# Patient Record
Sex: MALE | Race: WHITE | NOT HISPANIC OR LATINO | URBAN - METROPOLITAN AREA
[De-identification: names, ages, dates, MRNs, and addresses within clinical notes are randomized per-mention and may not be internally consistent; named-entity substitution may affect disease eponyms.]

---

## 2020-06-10 ENCOUNTER — TELEPHONE ENCOUNTER (OUTPATIENT)
Dept: URBAN - METROPOLITAN AREA CLINIC 23 | Facility: CLINIC | Age: 55
End: 2020-06-10

## 2020-07-16 ENCOUNTER — LAB OUTSIDE AN ENCOUNTER (OUTPATIENT)
Dept: URBAN - METROPOLITAN AREA CLINIC 92 | Facility: CLINIC | Age: 55
End: 2020-07-16

## 2020-07-16 ENCOUNTER — TELEPHONE ENCOUNTER (OUTPATIENT)
Dept: URBAN - METROPOLITAN AREA CLINIC 92 | Facility: CLINIC | Age: 55
End: 2020-07-16

## 2020-07-17 ENCOUNTER — WEB ENCOUNTER (OUTPATIENT)
Dept: URBAN - METROPOLITAN AREA CLINIC 94 | Facility: CLINIC | Age: 55
End: 2020-07-17

## 2020-07-17 ENCOUNTER — OFFICE VISIT (OUTPATIENT)
Dept: URBAN - METROPOLITAN AREA CLINIC 94 | Facility: CLINIC | Age: 55
End: 2020-07-17
Payer: COMMERCIAL

## 2020-07-17 DIAGNOSIS — I85.00 ESOPHAGEAL VARICES: ICD-10-CM

## 2020-07-17 DIAGNOSIS — K74.60 DECOMPENSATED CIRRHOSIS OF LIVER: ICD-10-CM

## 2020-07-17 DIAGNOSIS — R18.8 ASCITES: ICD-10-CM

## 2020-07-17 DIAGNOSIS — K74.69 CIRRHOSIS, CRYPTOGENIC: ICD-10-CM

## 2020-07-17 DIAGNOSIS — R41.0 CONFUSION: ICD-10-CM

## 2020-07-17 PROCEDURE — G8417 CALC BMI ABV UP PARAM F/U: HCPCS | Performed by: INTERNAL MEDICINE

## 2020-07-17 PROCEDURE — 3017F COLORECTAL CA SCREEN DOC REV: CPT | Performed by: INTERNAL MEDICINE

## 2020-07-17 PROCEDURE — 4004F PT TOBACCO SCREEN RCVD TLK: CPT | Performed by: INTERNAL MEDICINE

## 2020-07-17 PROCEDURE — G8427 DOCREV CUR MEDS BY ELIG CLIN: HCPCS | Performed by: INTERNAL MEDICINE

## 2020-07-17 PROCEDURE — G9906 PT RECV TBCO CESS INTERV: HCPCS | Performed by: INTERNAL MEDICINE

## 2020-07-17 PROCEDURE — G9902 PT SCRN TBCO AND ID AS USER: HCPCS | Performed by: INTERNAL MEDICINE

## 2020-07-17 PROCEDURE — 99214 OFFICE O/P EST MOD 30 MIN: CPT | Performed by: INTERNAL MEDICINE

## 2020-07-17 RX ORDER — RIFAXIMIN 550 MG/1
1 TABLET TABLET ORAL TWICE A DAY
Qty: 60 TABLET | Refills: 3 | OUTPATIENT
Start: 2020-07-17 | End: 2020-11-13

## 2020-07-17 RX ORDER — PROPRANOLOL HYDROCHLORIDE 10 MG/1
1 TABLET TABLET ORAL ONCE A DAY
Status: ACTIVE | COMMUNITY

## 2020-07-17 RX ORDER — IPRATROPIUM BROMIDE AND ALBUTEROL SULFATE 2.5; .5 MG/3ML; MG/3ML
SOLUTION RESPIRATORY (INHALATION)
Qty: 0 | Refills: 0 | Status: DISCONTINUED | COMMUNITY
Start: 1900-01-01

## 2020-07-17 RX ORDER — METRONIDAZOLE 500 MG/1
TAKE 1 TABLET (500 MG) BY ORAL ROUTE 3 TIMES PER DAY FOR 10 DAYS TABLET, FILM COATED ORAL
Qty: 30 | Refills: 0 | Status: DISCONTINUED | COMMUNITY
Start: 2017-08-31

## 2020-07-17 RX ORDER — SPIRONOLACTONE 100 MG/1
TAKE 1 TABLET (100 MG) BY ORAL ROUTE ONCE DAILY FOR 30 DAYS TABLET, FILM COATED ORAL 1
Qty: 30 | Refills: 6 | Status: ACTIVE | COMMUNITY
Start: 2020-05-01 | End: 2020-11-27

## 2020-07-17 RX ORDER — FUROSEMIDE 40 MG/1
TAKE 1 TABLET (40 MG) BY ORAL ROUTE ONCE DAILY FOR 30 DAYS TABLET ORAL 1
Qty: 30 | Refills: 6 | Status: ACTIVE | COMMUNITY
Start: 2020-05-01 | End: 2020-11-27

## 2020-07-17 RX ORDER — RIFAXIMIN 550 MG/1
TAKE 1 TABLET (550 MG) BY ORAL ROUTE 2 TIMES PER DAY FOR 21 DAYS TABLET ORAL 2
Qty: 42 | Refills: 1 | Status: DISCONTINUED | COMMUNITY
Start: 2017-08-22

## 2020-07-17 RX ORDER — SPIRONOLACTONE 100 MG/1
1 TABLET TABLET, FILM COATED ORAL BID
Qty: 60 | Refills: 4 | OUTPATIENT
Start: 2020-07-17 | End: 2020-12-13

## 2020-07-17 NOTE — PHYSICAL EXAM GASTROINTESTINAL
Abdomen ,obese  nontender, moderate abdominal distention with large volume ascites,  striae present lower abdomen, no guarding or rigidity , no masses palpable , normal bowel sounds , Liver and Spleen  no hepatosplenomegaly , liver nontender , spleen not palpable

## 2020-07-17 NOTE — HPI-TODAY'S VISIT:
The patient presents today for unscheduled office visit. He was in the office for labs and requested office visit. She states that her  has been acting strange lately. He has been confused lately and is easily agitated/angered. He reports noncompliance with medications and only takes Furosemide every 3-4 days when he feels abdominal swelling. He was previously on Xifaxan but discontinued medication awhile ago. His wife is concerned because his abdomen is very swollen and mildly erythematous. He reports having a fever earlier in the week but he did not actually take it.   Patient was previously evaluated by DR Bradley Dietz for possible transplant but he failed screening secondary to tobacco use. Patient is scheduled to have an EGD on Monday at [facility]. Last Paracentesis was June 18th at [facility].   Patient denies ever having a colonoscopy.

## 2020-07-18 LAB
A/G RATIO: 1.2
ALBUMIN: 2.9
ALKALINE PHOSPHATASE: 111
ALT (SGPT): 6
AST (SGOT): 12
BASO (ABSOLUTE): 0
BASOS: 0
BILIRUBIN, TOTAL: 1.3
BUN/CREATININE RATIO: 16
BUN: 11
CALCIUM: 8.1
CARBON DIOXIDE, TOTAL: 23
CHLORIDE: 103
CREATININE: 0.68
EGFR IF AFRICN AM: 125
EGFR IF NONAFRICN AM: 108
EOS (ABSOLUTE): 0.1
EOS: 2
GLOBULIN, TOTAL: 2.4
GLUCOSE: 121
HEMATOCRIT: 25.9
HEMATOLOGY COMMENTS:: (no result)
HEMOGLOBIN: 7.8
IMMATURE CELLS: (no result)
IMMATURE GRANS (ABS): 0
IMMATURE GRANULOCYTES: 0
INR: 1.2
LYMPHS (ABSOLUTE): 0.3
LYMPHS: 11
MCH: 25.9
MCHC: 30.1
MCV: 86
MONOCYTES(ABSOLUTE): 0.2
MONOCYTES: 9
NEUTROPHILS (ABSOLUTE): 2
NEUTROPHILS: 78
NRBC: (no result)
PLATELETS: 57
POTASSIUM: 3.9
PROTEIN, TOTAL: 5.3
PROTHROMBIN TIME: 13.1
RBC: 3.01
RDW: 14.5
SODIUM: 136
WBC: 2.6

## 2020-07-20 ENCOUNTER — OFFICE VISIT (OUTPATIENT)
Dept: URBAN - METROPOLITAN AREA MEDICAL CENTER 34 | Facility: MEDICAL CENTER | Age: 55
End: 2020-07-20

## 2020-07-20 LAB — AMMONIA, PLASMA: 59

## 2020-08-03 ENCOUNTER — TELEPHONE ENCOUNTER (OUTPATIENT)
Dept: URBAN - METROPOLITAN AREA CLINIC 94 | Facility: CLINIC | Age: 55
End: 2020-08-03

## 2020-08-04 ENCOUNTER — OFFICE VISIT (OUTPATIENT)
Dept: URBAN - METROPOLITAN AREA CLINIC 94 | Facility: CLINIC | Age: 55
End: 2020-08-04

## 2020-09-14 ENCOUNTER — TELEPHONE ENCOUNTER (OUTPATIENT)
Dept: URBAN - METROPOLITAN AREA CLINIC 94 | Facility: CLINIC | Age: 55
End: 2020-09-14

## 2020-11-16 ENCOUNTER — TELEPHONE ENCOUNTER (OUTPATIENT)
Dept: URBAN - METROPOLITAN AREA CLINIC 92 | Facility: CLINIC | Age: 55
End: 2020-11-16

## 2020-11-30 ENCOUNTER — WEB ENCOUNTER (OUTPATIENT)
Dept: URBAN - METROPOLITAN AREA CLINIC 94 | Facility: CLINIC | Age: 55
End: 2020-11-30

## 2020-12-01 ENCOUNTER — LAB OUTSIDE AN ENCOUNTER (OUTPATIENT)
Dept: URBAN - METROPOLITAN AREA CLINIC 94 | Facility: CLINIC | Age: 55
End: 2020-12-01

## 2020-12-01 ENCOUNTER — WEB ENCOUNTER (OUTPATIENT)
Dept: URBAN - METROPOLITAN AREA CLINIC 94 | Facility: CLINIC | Age: 55
End: 2020-12-01

## 2020-12-01 ENCOUNTER — OFFICE VISIT (OUTPATIENT)
Dept: URBAN - METROPOLITAN AREA CLINIC 94 | Facility: CLINIC | Age: 55
End: 2020-12-01
Payer: COMMERCIAL

## 2020-12-01 DIAGNOSIS — K74.60 CIRRHOSIS: ICD-10-CM

## 2020-12-01 DIAGNOSIS — R18.8 ASCITES: ICD-10-CM

## 2020-12-01 PROCEDURE — 99214 OFFICE O/P EST MOD 30 MIN: CPT | Performed by: INTERNAL MEDICINE

## 2020-12-01 PROCEDURE — 3017F COLORECTAL CA SCREEN DOC REV: CPT | Performed by: INTERNAL MEDICINE

## 2020-12-01 PROCEDURE — G8427 DOCREV CUR MEDS BY ELIG CLIN: HCPCS | Performed by: INTERNAL MEDICINE

## 2020-12-01 PROCEDURE — G8420 CALC BMI NORM PARAMETERS: HCPCS | Performed by: INTERNAL MEDICINE

## 2020-12-01 RX ORDER — FUROSEMIDE 40 MG/1
1 TABLET TABLET ORAL ONCE A DAY
Qty: 30 | OUTPATIENT
Start: 2020-12-01

## 2020-12-01 RX ORDER — SPIRONOLACTONE 100 MG/1
1 TABLET TABLET, FILM COATED ORAL BID
Qty: 60 | Refills: 4 | Status: ACTIVE | COMMUNITY
Start: 2020-07-17 | End: 2020-12-13

## 2020-12-01 RX ORDER — LACTULOSE 10 G/15ML
30ML SOLUTION ORAL TWICE A DAY
Qty: 1800 ML | Refills: 6 | OUTPATIENT
Start: 2020-12-01 | End: 2021-06-29

## 2020-12-01 RX ORDER — SPIRONOLACTONE 100 MG/1
1 TABLET TABLET, FILM COATED ORAL BID
Qty: 60 | Refills: 4
Start: 2020-07-17

## 2020-12-01 RX ORDER — PROPRANOLOL HYDROCHLORIDE 10 MG/1
1 TABLET TABLET ORAL ONCE A DAY
Status: ACTIVE | COMMUNITY

## 2020-12-01 NOTE — PHYSICAL EXAM GASTROINTESTINAL
Abdomen ,  Ascites present, moderate distention, no tenderness, no peritoneal signs, reducible umbilical hernia, BS +

## 2020-12-01 NOTE — HPI-TODAY'S VISIT:
Pt has decompensated alcoholic cirrhosis with ascites, edema, hepatic encepaholpathy and esophageal varices. Pt previously turned down for liver transplant because of noncompliance issues . Currently, pt is reportedly compliant to lasix 40 mg/day and aldactone 100 mg/day. He is still developing recurrent large ascites requiring paracentesis every 2 weeks. Also has some confusion off and on. Denies abdominal pain, N/V, hematemesis or melena. Receiving iron infusions by heme-onc. No ETOH use reported.

## 2020-12-02 LAB
A/G RATIO: 1.7
AFP, SERUM, TUMOR MARKER: 2.8
ALBUMIN: 3.9
ALKALINE PHOSPHATASE: 150
ALT (SGPT): 14
AMMONIA, PLASMA: <17
AST (SGOT): 27
BANDS: (no result)
BASO (ABSOLUTE): 0
BASOS: 2
BILIRUBIN, TOTAL: 1.5
BLASTS/BLAST LIKE CELLS: (no result)
BUN/CREATININE RATIO: 14
BUN: 9
CALCIUM: 8.4
CARBON DIOXIDE, TOTAL: 27
CHLORIDE: 102
CREATININE: 0.65
EGFR IF AFRICN AM: 128
EGFR IF NONAFRICN AM: 110
EOS (ABSOLUTE): 0.1
EOS: 3
GLOBULIN, TOTAL: 2.3
GLUCOSE: 98
HEMATOCRIT: 30.8
HEMATOLOGY COMMENTS:: (no result)
HEMOGLOBIN: 10.1
IMMATURE CELLS: (no result)
IMMATURE GRANS (ABS): (no result)
IMMATURE GRANULOCYTES: (no result)
INR: 1.3
LYMPHS (ABSOLUTE): 0.2
LYMPHS: 12
MCH: 31
MCHC: 32.8
MCV: 95
MEGAKARYOCYTES: (no result)
METAMYELOCYTES: (no result)
MONOCYTES(ABSOLUTE): 0.1
MONOCYTES: 3
MYELOCYTES: 1
NEUTROPHILS (ABSOLUTE): 1.4
NEUTROPHILS: 79
NRBC: (no result)
OTHER, LINEAGE UNCERTAIN: (no result)
PLATELETS: 51
POTASSIUM: 3.9
PROMYELOCYTES: (no result)
PROTEIN, TOTAL: 6.2
PROTHROMBIN TIME: 13.8
RBC: 3.26
RDW: 14.6
SODIUM: 140
WBC: 1.8

## 2020-12-11 ENCOUNTER — TELEPHONE ENCOUNTER (OUTPATIENT)
Dept: URBAN - METROPOLITAN AREA CLINIC 94 | Facility: CLINIC | Age: 55
End: 2020-12-11

## 2021-01-04 ENCOUNTER — OFFICE VISIT (OUTPATIENT)
Dept: URBAN - METROPOLITAN AREA CLINIC 94 | Facility: CLINIC | Age: 56
End: 2021-01-04

## 2021-01-19 ENCOUNTER — OFFICE VISIT (OUTPATIENT)
Dept: URBAN - METROPOLITAN AREA CLINIC 94 | Facility: CLINIC | Age: 56
End: 2021-01-19

## 2021-01-19 RX ORDER — LACTULOSE 10 G/15ML
30ML SOLUTION ORAL TWICE A DAY
Qty: 1800 ML | Refills: 6 | Status: ACTIVE | COMMUNITY
Start: 2020-12-01 | End: 2021-06-29

## 2021-01-19 RX ORDER — SPIRONOLACTONE 100 MG/1
1 TABLET TABLET, FILM COATED ORAL BID
Qty: 60 | Refills: 4 | Status: ACTIVE | COMMUNITY
Start: 2020-07-17

## 2021-01-19 RX ORDER — FUROSEMIDE 40 MG/1
1 TABLET TABLET ORAL ONCE A DAY
Qty: 30 | Status: ACTIVE | COMMUNITY
Start: 2020-12-01

## 2021-01-19 RX ORDER — PROPRANOLOL HYDROCHLORIDE 10 MG/1
1 TABLET TABLET ORAL ONCE A DAY
Status: ACTIVE | COMMUNITY

## 2021-01-20 ENCOUNTER — OFFICE VISIT (OUTPATIENT)
Dept: URBAN - METROPOLITAN AREA CLINIC 94 | Facility: CLINIC | Age: 56
End: 2021-01-20
Payer: COMMERCIAL

## 2021-01-20 VITALS
BODY MASS INDEX: 37.38 KG/M2 | DIASTOLIC BLOOD PRESSURE: 66 MMHG | HEART RATE: 72 BPM | TEMPERATURE: 98.1 F | HEIGHT: 72 IN | SYSTOLIC BLOOD PRESSURE: 124 MMHG | WEIGHT: 276 LBS

## 2021-01-20 DIAGNOSIS — R18.8 ASCITES: ICD-10-CM

## 2021-01-20 DIAGNOSIS — I85.00 ESOPHAGEAL VARICES: ICD-10-CM

## 2021-01-20 DIAGNOSIS — K74.69 CIRRHOSIS, CRYPTOGENIC: ICD-10-CM

## 2021-01-20 PROCEDURE — G8418 CALC BMI BLW LOW PARAM F/U: HCPCS | Performed by: INTERNAL MEDICINE

## 2021-01-20 PROCEDURE — 99214 OFFICE O/P EST MOD 30 MIN: CPT | Performed by: INTERNAL MEDICINE

## 2021-01-20 PROCEDURE — G8427 DOCREV CUR MEDS BY ELIG CLIN: HCPCS | Performed by: INTERNAL MEDICINE

## 2021-01-20 PROCEDURE — G8482 FLU IMMUNIZE ORDER/ADMIN: HCPCS | Performed by: INTERNAL MEDICINE

## 2021-01-20 RX ORDER — FUROSEMIDE 40 MG/1
1 TABLET TABLET ORAL ONCE A DAY
Qty: 30 | Status: ACTIVE | COMMUNITY
Start: 2020-12-01

## 2021-01-20 RX ORDER — LACTULOSE 10 G/15ML
30ML SOLUTION ORAL TWICE A DAY
Qty: 1800 ML | Refills: 6 | Status: ACTIVE | COMMUNITY
Start: 2020-12-01 | End: 2021-06-29

## 2021-01-20 RX ORDER — PROPRANOLOL HYDROCHLORIDE 10 MG/1
1 TABLET TABLET ORAL ONCE A DAY
Status: ACTIVE | COMMUNITY

## 2021-01-20 RX ORDER — SPIRONOLACTONE 100 MG/1
1 TABLET TABLET, FILM COATED ORAL BID
Qty: 60 | Refills: 4 | Status: ACTIVE | COMMUNITY
Start: 2020-07-17

## 2021-01-20 NOTE — PHYSICAL EXAM GASTROINTESTINAL
Abdomen , soft, nontender, distended, no guarding or rigidity , no masses palpable , normal bowel sounds, ascites present.  Liver and Spleen no hepatosplenomegaly , liver nontender , spleen not palpable

## 2021-01-20 NOTE — HPI-TODAY'S VISIT:
Mr. Fournier presents today in followup from previous paracentesis which was performed on 1/14.  He reports weight loss of 28 pounds.  Paracentesis report reveals drainage of 7250 ml of ascitic fluid.  He states that he is doing well today, that his shoes are more loose and he believes the feet and ankle edema is decreasing. He also believes that his confusion has decreased.    Pt has decompensated alcoholic cirrhosis with ascites, edema, hepatic encepaholpathy and esophageal varices. Pt previously turned down for liver transplant because of noncompliance issues . Currently, pt is reportedly compliant to lasix 40 mg/day and aldactone 100 mg/day. He is still developing recurrent large ascites requiring paracentesis every 2 weeks. Also has some confusion off and on. Denies abdominal pain, N/V, hematemesis or melena. Receiving iron infusions by heme-onc. No ETOH use reported.

## 2021-01-21 LAB
A/G RATIO: 1.4
ALBUMIN: 3.6
ALKALINE PHOSPHATASE: 155
ALT (SGPT): 11
AST (SGOT): 22
BILIRUBIN, TOTAL: 1
BUN/CREATININE RATIO: 16
BUN: 11
CALCIUM: 8.3
CARBON DIOXIDE, TOTAL: 25
CHLORIDE: 104
CREATININE: 0.67
EGFR IF AFRICN AM: 126
EGFR IF NONAFRICN AM: 109
GLOBULIN, TOTAL: 2.5
GLUCOSE: 92
POTASSIUM: 3.9
PROTEIN, TOTAL: 6.1
SODIUM: 140

## 2021-02-05 ENCOUNTER — TELEPHONE ENCOUNTER (OUTPATIENT)
Dept: URBAN - METROPOLITAN AREA CLINIC 94 | Facility: CLINIC | Age: 56
End: 2021-02-05

## 2021-02-06 PROBLEM — 28670008 ESOPHAGEAL VARICES: Status: ACTIVE | Noted: 2021-01-20

## 2021-02-11 ENCOUNTER — TELEPHONE ENCOUNTER (OUTPATIENT)
Dept: URBAN - METROPOLITAN AREA CLINIC 94 | Facility: CLINIC | Age: 56
End: 2021-02-11

## 2021-09-30 ENCOUNTER — TELEPHONE ENCOUNTER (OUTPATIENT)
Dept: URBAN - METROPOLITAN AREA CLINIC 92 | Facility: CLINIC | Age: 56
End: 2021-09-30

## 2022-11-17 ENCOUNTER — TELEPHONE ENCOUNTER (OUTPATIENT)
Dept: URBAN - METROPOLITAN AREA CLINIC 94 | Facility: CLINIC | Age: 57
End: 2022-11-17

## 2022-11-17 PROBLEM — 1082601000119104: Status: ACTIVE | Noted: 2022-11-17

## 2022-11-30 ENCOUNTER — CLAIMS CREATED FROM THE CLAIM WINDOW (OUTPATIENT)
Dept: URBAN - METROPOLITAN AREA CLINIC 94 | Facility: CLINIC | Age: 57
End: 2022-11-30
Payer: COMMERCIAL

## 2022-11-30 VITALS
TEMPERATURE: 97.2 F | SYSTOLIC BLOOD PRESSURE: 129 MMHG | WEIGHT: 265 LBS | BODY MASS INDEX: 35.89 KG/M2 | HEART RATE: 78 BPM | HEIGHT: 72 IN | DIASTOLIC BLOOD PRESSURE: 67 MMHG

## 2022-11-30 DIAGNOSIS — R18.8 OTHER ASCITES: ICD-10-CM

## 2022-11-30 DIAGNOSIS — K74.60 UNSPECIFIED CIRRHOSIS OF LIVER: ICD-10-CM

## 2022-11-30 DIAGNOSIS — Z87.19 HX OF ESOPHAGEAL VARICES: ICD-10-CM

## 2022-11-30 DIAGNOSIS — K74.69 OTHER CIRRHOSIS OF LIVER: ICD-10-CM

## 2022-11-30 PROCEDURE — 99214 OFFICE O/P EST MOD 30 MIN: CPT | Performed by: INTERNAL MEDICINE

## 2022-11-30 PROCEDURE — 99214 OFFICE O/P EST MOD 30 MIN: CPT | Performed by: PHYSICIAN ASSISTANT

## 2022-11-30 RX ORDER — OXYCODONE HYDROCHLORIDE 10 MG/1
1 TABLET AS NEEDED TABLET ORAL
Status: ACTIVE | COMMUNITY

## 2022-11-30 RX ORDER — FUROSEMIDE 40 MG/1
1 TABLET TABLET ORAL ONCE A DAY
Qty: 30 | Status: ACTIVE | COMMUNITY
Start: 2020-12-01

## 2022-11-30 RX ORDER — PROPRANOLOL HYDROCHLORIDE 10 MG/1
1 TABLET TABLET ORAL ONCE A DAY
Status: ACTIVE | COMMUNITY

## 2022-11-30 RX ORDER — SPIRONOLACTONE 100 MG/1
1 TABLET TABLET, FILM COATED ORAL BID
Qty: 60 | Refills: 6
Start: 2020-07-17 | End: 2023-06-28

## 2022-11-30 RX ORDER — BUPRENORPHINE HYDROCHLORIDE 600 UG/1
1 FILM FILM, SOLUBLE BUCCAL
Status: ACTIVE | COMMUNITY

## 2022-11-30 RX ORDER — FUROSEMIDE 40 MG/1
1 TABLET TABLET ORAL ONCE A DAY
Qty: 30 | Refills: 6
Start: 2020-12-01

## 2022-11-30 RX ORDER — CYCLOBENZAPRINE HYDROCHLORIDE 10 MG/1
1 TABLET AT BEDTIME AS NEEDED TABLET, FILM COATED ORAL ONCE A DAY
Status: ACTIVE | COMMUNITY

## 2022-11-30 RX ORDER — SPIRONOLACTONE 100 MG/1
1 TABLET TABLET, FILM COATED ORAL BID
Qty: 60 | Refills: 4 | Status: ACTIVE | COMMUNITY
Start: 2020-07-17

## 2022-11-30 NOTE — HPI-TODAY'S VISIT:
58 yo M evaluated today for hx of alcoholic decompensated cirrhosis with hx of ascites, hepatic encephalopathy and esophageal varices.  Pt reports previous paracentesis  on 11/18/22 with 12,000 ml of fluid removed.  Pt continues LVP every 2 weeks, and he reports improvement after 8-10 days. Pt ran out of Lasix 40 mg qd on Monday and reports being without Aldactone for many mos.   Pt's last OV ~ 1 yr ago. He reports chief complaint of recurrent ascites. He denies RUQ pain, itching or jaundice.  BM at least twice/day without bleeding. Negative Melena or hematochezia. Pt denies N/V or hematemesis.  Denies hospitalizations since last visit. Denies ETOH use. Pt denies confusion.  No recent labs for review in [facility].   EGD 2020- Esophageal varices, status post banding x 4.

## 2022-11-30 NOTE — PHYSICAL EXAM GASTROINTESTINAL
Abdomen , soft, moderate to severe abdominal distention due to ascites, nontender, distended, no guarding or rigidity , no masses palpable , normal bowel sounds, ascites present.  Liver and Spleen no hepatosplenomegaly , liver nontender , spleen not palpable

## 2022-12-01 PROBLEM — 716203000 DECOMPENSATED CIRRHOSIS OF LIVER: Status: ACTIVE | Noted: 2022-12-01

## 2022-12-01 LAB
A/G RATIO: 1.3
ABSOLUTE BASOPHILS: 9
ABSOLUTE EOSINOPHILS: 41
ABSOLUTE LYMPHOCYTES: 232
ABSOLUTE MONOCYTES: 180
ABSOLUTE NEUTROPHILS: 2439
AFP, SERUM, TUMOR MARKER: 4
ALBUMIN: 3.3
ALKALINE PHOSPHATASE: 117
ALT (SGPT): 15
AST (SGOT): 23
BASOPHILS: 0.3
BILIRUBIN, TOTAL: 1.4
BUN/CREATININE RATIO: 19
BUN: 13
CALCIUM: 8.2
CARBON DIOXIDE, TOTAL: 31
CHLORIDE: 102
COMMENT(S): (no result)
CREATININE: 0.68
EGFR: 109
EOSINOPHILS: 1.4
GLOBULIN, TOTAL: 2.6
GLUCOSE: 109
HEMATOCRIT: 30.9
HEMOGLOBIN: 10.2
INR: 1.3
LYMPHOCYTES: 8
MCH: 30.2
MCHC: 33
MCV: 91.4
MONOCYTES: 6.2
MPV: 10.9
NEUTROPHILS: 84.1
PLATELET COUNT: 58
PLATELET ESTIMATION: (no result)
POTASSIUM: 3.8
PROTEIN, TOTAL: 5.9
PT: 13.5
RDW: 13.9
RED BLOOD CELL COUNT: 3.38
SODIUM: 137
WHITE BLOOD CELL COUNT: 2.9

## 2022-12-01 NOTE — PHYSICAL EXAM CONSTITUTIONAL:
well developed, well nourished , in no acute distress , ambulating without difficulty , normal communication ability
PAST SURGICAL HISTORY:  H/O hernia repair     S/P arteriovenous (AV) fistula creation

## 2022-12-05 ENCOUNTER — OFFICE VISIT (OUTPATIENT)
Dept: URBAN - METROPOLITAN AREA CLINIC 94 | Facility: CLINIC | Age: 57
End: 2022-12-05

## 2022-12-12 ENCOUNTER — TELEPHONE ENCOUNTER (OUTPATIENT)
Dept: URBAN - METROPOLITAN AREA CLINIC 94 | Facility: CLINIC | Age: 57
End: 2022-12-12

## 2022-12-12 RX ORDER — SPIRONOLACTONE 100 MG/1
1 TABLET TABLET, FILM COATED ORAL BID
Qty: 60 | Refills: 6

## 2022-12-12 RX ORDER — FUROSEMIDE 40 MG/1
1 TABLET TABLET ORAL ONCE A DAY
Qty: 30 | Refills: 6
Start: 2020-12-01

## 2022-12-13 PROBLEM — 19943007 CIRRHOSIS OF LIVER: Status: ACTIVE | Noted: 2022-12-13

## 2022-12-21 ENCOUNTER — OFFICE VISIT (OUTPATIENT)
Dept: URBAN - METROPOLITAN AREA MEDICAL CENTER 34 | Facility: MEDICAL CENTER | Age: 57
End: 2022-12-21

## 2022-12-23 ENCOUNTER — OFFICE VISIT (OUTPATIENT)
Dept: URBAN - METROPOLITAN AREA MEDICAL CENTER 34 | Facility: MEDICAL CENTER | Age: 57
End: 2022-12-23
Payer: COMMERCIAL

## 2022-12-23 DIAGNOSIS — I85.10 ESOPH VARICE OTHER DIS: ICD-10-CM

## 2022-12-23 DIAGNOSIS — K74.69 CIRRHOSIS, CRYPTOGENIC: ICD-10-CM

## 2022-12-23 PROCEDURE — 43235 EGD DIAGNOSTIC BRUSH WASH: CPT | Performed by: INTERNAL MEDICINE

## 2022-12-23 RX ORDER — FUROSEMIDE 40 MG/1
1 TABLET TABLET ORAL ONCE A DAY
Qty: 30 | Refills: 6 | Status: ACTIVE | COMMUNITY
Start: 2020-12-01

## 2022-12-23 RX ORDER — PROPRANOLOL HYDROCHLORIDE 10 MG/1
1 TABLET TABLET ORAL ONCE A DAY
Status: ACTIVE | COMMUNITY

## 2022-12-23 RX ORDER — BUPRENORPHINE HYDROCHLORIDE 600 UG/1
1 FILM FILM, SOLUBLE BUCCAL
Status: ACTIVE | COMMUNITY

## 2022-12-23 RX ORDER — SPIRONOLACTONE 100 MG/1
1 TABLET TABLET, FILM COATED ORAL BID
Qty: 60 | Refills: 6 | Status: ACTIVE | COMMUNITY

## 2022-12-23 RX ORDER — PROPRANOLOL HYDROCHLORIDE 10 MG/1
1 TABLET TABLET ORAL ONCE A DAY
OUTPATIENT

## 2022-12-23 RX ORDER — FUROSEMIDE 40 MG/1
1 TABLET TABLET ORAL TWICE A DAY
Qty: 60 TABLET | Refills: 6
Start: 2020-12-01

## 2022-12-23 RX ORDER — CYCLOBENZAPRINE HYDROCHLORIDE 10 MG/1
1 TABLET AT BEDTIME AS NEEDED TABLET, FILM COATED ORAL ONCE A DAY
Status: ACTIVE | COMMUNITY

## 2022-12-23 RX ORDER — OXYCODONE HYDROCHLORIDE 10 MG/1
1 TABLET AS NEEDED TABLET ORAL
Status: ACTIVE | COMMUNITY

## 2023-01-30 ENCOUNTER — OFFICE VISIT (OUTPATIENT)
Dept: URBAN - METROPOLITAN AREA CLINIC 94 | Facility: CLINIC | Age: 58
End: 2023-01-30
Payer: COMMERCIAL

## 2023-01-30 VITALS
BODY MASS INDEX: 34.27 KG/M2 | HEART RATE: 93 BPM | TEMPERATURE: 97.7 F | WEIGHT: 253 LBS | DIASTOLIC BLOOD PRESSURE: 70 MMHG | HEIGHT: 72 IN | SYSTOLIC BLOOD PRESSURE: 145 MMHG

## 2023-01-30 DIAGNOSIS — R18.8 OTHER ASCITES: ICD-10-CM

## 2023-01-30 DIAGNOSIS — K74.60 UNSPECIFIED CIRRHOSIS OF LIVER: ICD-10-CM

## 2023-01-30 PROBLEM — 389026000: Status: ACTIVE | Noted: 2021-09-30

## 2023-01-30 PROBLEM — 19943007: Status: ACTIVE | Noted: 2022-11-30

## 2023-01-30 PROCEDURE — 99214 OFFICE O/P EST MOD 30 MIN: CPT | Performed by: INTERNAL MEDICINE

## 2023-01-30 RX ORDER — SPIRONOLACTONE 100 MG/1
2 TABLET TABLET, FILM COATED ORAL QD
Qty: 60 | Refills: 6

## 2023-01-30 RX ORDER — OXYCODONE HYDROCHLORIDE 10 MG/1
1 TABLET AS NEEDED TABLET ORAL
Status: ACTIVE | COMMUNITY

## 2023-01-30 RX ORDER — BUPRENORPHINE HYDROCHLORIDE 600 UG/1
1 FILM FILM, SOLUBLE BUCCAL
Status: ACTIVE | COMMUNITY

## 2023-01-30 RX ORDER — SPIRONOLACTONE 100 MG/1
1 TABLET TABLET, FILM COATED ORAL BID
Qty: 60 | Refills: 6 | Status: ACTIVE | COMMUNITY

## 2023-01-30 RX ORDER — CYCLOBENZAPRINE HYDROCHLORIDE 10 MG/1
1 TABLET AT BEDTIME AS NEEDED TABLET, FILM COATED ORAL ONCE A DAY
Status: ACTIVE | COMMUNITY

## 2023-01-30 RX ORDER — FUROSEMIDE 40 MG/1
1 TABLET TABLET ORAL TWICE A DAY
Qty: 60 TABLET | Refills: 6 | Status: ACTIVE | COMMUNITY
Start: 2020-12-01

## 2023-01-30 RX ORDER — FUROSEMIDE 40 MG/1
2 TABLET TABLET ORAL ONCE A DAY
Qty: 60 | Refills: 6

## 2023-01-30 NOTE — HPI-TODAY'S VISIT:
57 yo M evaluated today for hx of alcoholic decompensated cirrhosis with hx of ascites, hepatic encephalopathy and esophageal varices. Pt returns to discuss increase in diuretics and post procedure and US f/u visit.   USliver - no focal lesions, cirrhosis, GB wall thickening     EGD 12/2022 - Small grade I esophageal varices without stigmata of bleeding, too small to be banded Normal gastric mucosa Normal duodenal mucosa  Pt continues paracentesis every 2 weeks - ascitic fluid checked - negative for SBP   Pt reports taking Lasix 40 mg and Spirinolactone 50 mg BID.   Stools 2 stools/without melena or hematochezia.   Today patient reports feeling well. He denies abdominal pain, jaundice, N/V or hematemesis. He denies RUQ pain, or itching Denies hospitalizations since last visit. Denies ETOH use. Pt denies confusion.  Last Colonoscopy 2014 - nml colon, deies family hx of colon cancer or polyps

## 2023-01-31 ENCOUNTER — TELEPHONE ENCOUNTER (OUTPATIENT)
Dept: URBAN - METROPOLITAN AREA CLINIC 94 | Facility: CLINIC | Age: 58
End: 2023-01-31

## 2023-01-31 PROBLEM — 389026000 ASCITES: Status: ACTIVE | Noted: 2020-12-01

## 2023-01-31 LAB
A/G RATIO: 1.5
ABSOLUTE BASOPHILS: 31
ABSOLUTE EOSINOPHILS: 139
ABSOLUTE LYMPHOCYTES: 296
ABSOLUTE MONOCYTES: 258
ABSOLUTE NEUTROPHILS: 2676
ALBUMIN: 3.3
ALKALINE PHOSPHATASE: 112
ALT (SGPT): 7
AST (SGOT): 14
BASOPHILS: 0.9
BILIRUBIN, TOTAL: 1.5
BUN/CREATININE RATIO: (no result)
BUN: 10
CALCIUM: 7.8
CARBON DIOXIDE, TOTAL: 31
CHLORIDE: 102
COMMENT(S): (no result)
CREATININE: 0.72
EGFR: 107
EOSINOPHILS: 4.1
GLOBULIN, TOTAL: 2.2
GLUCOSE: 123
HEMATOCRIT: 29.7
HEMOGLOBIN: 9.5
INR: 1.3
LYMPHOCYTES: 8.7
MCH: 28.8
MCHC: 32
MCV: 90
MONOCYTES: 7.6
MPV: 9.7
NEUTROPHILS: 78.7
PLATELET COUNT: 86
POTASSIUM: 3.8
PROTEIN, TOTAL: 5.5
PT: 13
RDW: 13.9
RED BLOOD CELL COUNT: 3.3
SODIUM: 138
WHITE BLOOD CELL COUNT: 3.4

## 2023-02-10 ENCOUNTER — TELEPHONE ENCOUNTER (OUTPATIENT)
Dept: URBAN - METROPOLITAN AREA CLINIC 94 | Facility: CLINIC | Age: 58
End: 2023-02-10

## 2023-02-14 LAB
BUN/CREATININE RATIO: (no result)
CALCIUM: 8.1
CARBON DIOXIDE: 29
CHLORIDE: 101
CREATININE: 0.7
EGFR: 107
GLUCOSE: 139
POTASSIUM: 4.1
SODIUM: 137
UREA NITROGEN (BUN): 12

## 2023-04-24 ENCOUNTER — OFFICE VISIT (OUTPATIENT)
Dept: URBAN - METROPOLITAN AREA CLINIC 94 | Facility: CLINIC | Age: 58
End: 2023-04-24

## 2023-04-25 ENCOUNTER — DASHBOARD ENCOUNTERS (OUTPATIENT)
Age: 58
End: 2023-04-25

## 2023-04-25 ENCOUNTER — OFFICE VISIT (OUTPATIENT)
Dept: URBAN - METROPOLITAN AREA CLINIC 94 | Facility: CLINIC | Age: 58
End: 2023-04-25
Payer: COMMERCIAL

## 2023-04-25 ENCOUNTER — TELEPHONE ENCOUNTER (OUTPATIENT)
Dept: URBAN - METROPOLITAN AREA CLINIC 94 | Facility: CLINIC | Age: 58
End: 2023-04-25

## 2023-04-25 VITALS
HEIGHT: 72 IN | TEMPERATURE: 98.1 F | DIASTOLIC BLOOD PRESSURE: 70 MMHG | HEART RATE: 87 BPM | WEIGHT: 240 LBS | SYSTOLIC BLOOD PRESSURE: 117 MMHG | BODY MASS INDEX: 32.51 KG/M2

## 2023-04-25 DIAGNOSIS — K74.60 UNSPECIFIED CIRRHOSIS OF LIVER: ICD-10-CM

## 2023-04-25 DIAGNOSIS — R18.8 OTHER ASCITES: ICD-10-CM

## 2023-04-25 PROCEDURE — 99214 OFFICE O/P EST MOD 30 MIN: CPT | Performed by: INTERNAL MEDICINE

## 2023-04-25 RX ORDER — FUROSEMIDE 40 MG/1
2 TABLET TABLET ORAL ONCE A DAY
Qty: 60 | Refills: 6 | Status: ACTIVE | COMMUNITY

## 2023-04-25 RX ORDER — MULTIVIT-MIN/IRON/FOLIC ACID/K 18-600-40
1 CAPSULE CAPSULE ORAL ONCE A DAY
Status: ACTIVE | COMMUNITY

## 2023-04-25 RX ORDER — BUPRENORPHINE HYDROCHLORIDE 600 UG/1
1 FILM FILM, SOLUBLE BUCCAL
Status: ACTIVE | COMMUNITY

## 2023-04-25 RX ORDER — SPIRONOLACTONE 100 MG/1
2 TABLET TABLET, FILM COATED ORAL QD
Qty: 60 | Refills: 6 | Status: ACTIVE | COMMUNITY

## 2023-04-25 RX ORDER — CYCLOBENZAPRINE HYDROCHLORIDE 10 MG/1
1 TABLET AT BEDTIME AS NEEDED TABLET, FILM COATED ORAL ONCE A DAY
Status: ACTIVE | COMMUNITY

## 2023-04-25 RX ORDER — OXYCODONE HYDROCHLORIDE 10 MG/1
1 TABLET AS NEEDED TABLET ORAL
Status: ACTIVE | COMMUNITY

## 2023-04-25 NOTE — HPI-TODAY'S VISIT:
59 yo M evaluated today for hx of alcoholic decompensated cirrhosis with hx of ascites, hepatic encephalopathy and esophageal varices. Pt returns to discuss increase in diuretics and post procedure and US f/u visit.   Pt's diuretic dosage was doubled following last visit - Lasix 80 mg and Aldactone 200 mg daily. Pt reports feeling better with less swelling in abdomen and lower legs. He continues having paracentesis every 2 weeks - with 15 L removed.  Fluid analysis revealed no bacterial growth, negative for SBP (4/7/2023).  Pt had recent labs with DR Jere Medel and CMP reveals normal Na, K and stable renal function WBC 1.8, Hgb 9.2, AST/ALT normal, T Bili 1.5, MELD 11  US liver 12/22 - no focal lesions, cirrhosis, GB wall thickening     EGD 12/2022 - Small grade I esophageal varices without stigmata of bleeding, too small to be banded Normal gastric mucosa Normal duodenal mucosa  Stools 2-3 stools/without melena or hematochezia. Pt has Lactulose at home which he reports using prn.   Today patient reports feeling well. He denies abdominal pain, jaundice, N/V or hematemesis. He denies RUQ pain, or itching Denies hospitalizations since last visit. Denies ETOH use. Pt denies confusion. TIPS previously discussed with patient, and he reports that he is ready for evaluation.   Last Colonoscopy 2014 - [facility] colon, denies family hx of colon cancer or polyps

## 2023-04-26 LAB
INR: 1.3
PT: 13.1

## 2023-07-31 ENCOUNTER — OFFICE VISIT (OUTPATIENT)
Dept: URBAN - METROPOLITAN AREA CLINIC 94 | Facility: CLINIC | Age: 58
End: 2023-07-31

## 2025-03-29 ENCOUNTER — CLAIMS CREATED FROM THE CLAIM WINDOW (OUTPATIENT)
Dept: URBAN - METROPOLITAN AREA MEDICAL CENTER 34 | Facility: MEDICAL CENTER | Age: 60
End: 2025-03-29

## 2025-03-29 PROCEDURE — 99231 SBSQ HOSP IP/OBS SF/LOW 25: CPT | Performed by: INTERNAL MEDICINE
